# Patient Record
Sex: FEMALE | Race: WHITE | NOT HISPANIC OR LATINO | Employment: STUDENT | URBAN - METROPOLITAN AREA
[De-identification: names, ages, dates, MRNs, and addresses within clinical notes are randomized per-mention and may not be internally consistent; named-entity substitution may affect disease eponyms.]

---

## 2023-10-05 ENCOUNTER — HOSPITAL ENCOUNTER (EMERGENCY)
Facility: HOSPITAL | Age: 18
Discharge: HOME | End: 2023-10-05
Attending: EMERGENCY MEDICINE
Payer: COMMERCIAL

## 2023-10-05 VITALS
RESPIRATION RATE: 16 BRPM | TEMPERATURE: 97.6 F | DIASTOLIC BLOOD PRESSURE: 73 MMHG | OXYGEN SATURATION: 98 % | SYSTOLIC BLOOD PRESSURE: 121 MMHG | HEART RATE: 55 BPM

## 2023-10-05 DIAGNOSIS — R30.0 DYSURIA: ICD-10-CM

## 2023-10-05 DIAGNOSIS — N39.0 UTI (URINARY TRACT INFECTION): Primary | ICD-10-CM

## 2023-10-05 DIAGNOSIS — N39.0 RECURRENT UTI: ICD-10-CM

## 2023-10-05 LAB
BACTERIA UR QL AUTO: ABNORMAL /HPF
BILIRUB UR QL STRIP: NEGATIVE
CLARITY UR: ABNORMAL
COLOR UR: YELLOW
EXT PREGNANCY TEST URINE: NEGATIVE
EXT. CONTROL: NORMAL
GLUCOSE UR STRIP-MCNC: NEGATIVE MG/DL
HGB UR QL STRIP.AUTO: ABNORMAL
KETONES UR STRIP-MCNC: NEGATIVE MG/DL
LEUKOCYTE ESTERASE UR QL STRIP: ABNORMAL
MUCOUS THREADS UR QL AUTO: ABNORMAL
NITRITE UR QL STRIP: NEGATIVE
NON-SQ EPI CELLS URNS QL MICRO: ABNORMAL /HPF
PH UR STRIP.AUTO: 6.5 [PH]
PROT UR STRIP-MCNC: ABNORMAL MG/DL
RBC #/AREA URNS AUTO: ABNORMAL /HPF
SP GR UR STRIP.AUTO: 1.02 (ref 1–1.03)
UROBILINOGEN UR STRIP-ACNC: <2 MG/DL
WBC #/AREA URNS AUTO: ABNORMAL /HPF

## 2023-10-05 PROCEDURE — 87660 TRICHOMONAS VAGIN DIR PROBE: CPT | Performed by: PHYSICIAN ASSISTANT

## 2023-10-05 PROCEDURE — 87529 HSV DNA AMP PROBE: CPT | Performed by: PHYSICIAN ASSISTANT

## 2023-10-05 PROCEDURE — 87086 URINE CULTURE/COLONY COUNT: CPT | Performed by: PHYSICIAN ASSISTANT

## 2023-10-05 PROCEDURE — 81001 URINALYSIS AUTO W/SCOPE: CPT | Performed by: PHYSICIAN ASSISTANT

## 2023-10-05 PROCEDURE — 87186 SC STD MICRODIL/AGAR DIL: CPT | Performed by: PHYSICIAN ASSISTANT

## 2023-10-05 PROCEDURE — 87077 CULTURE AEROBIC IDENTIFY: CPT | Performed by: PHYSICIAN ASSISTANT

## 2023-10-05 PROCEDURE — 87480 CANDIDA DNA DIR PROBE: CPT | Performed by: PHYSICIAN ASSISTANT

## 2023-10-05 PROCEDURE — 87591 N.GONORRHOEAE DNA AMP PROB: CPT | Performed by: PHYSICIAN ASSISTANT

## 2023-10-05 PROCEDURE — 81025 URINE PREGNANCY TEST: CPT | Performed by: PHYSICIAN ASSISTANT

## 2023-10-05 PROCEDURE — 87491 CHLMYD TRACH DNA AMP PROBE: CPT | Performed by: PHYSICIAN ASSISTANT

## 2023-10-05 PROCEDURE — 99284 EMERGENCY DEPT VISIT MOD MDM: CPT | Performed by: PHYSICIAN ASSISTANT

## 2023-10-05 PROCEDURE — 87510 GARDNER VAG DNA DIR PROBE: CPT | Performed by: PHYSICIAN ASSISTANT

## 2023-10-05 PROCEDURE — 99283 EMERGENCY DEPT VISIT LOW MDM: CPT

## 2023-10-05 PROCEDURE — 96372 THER/PROPH/DIAG INJ SC/IM: CPT

## 2023-10-05 RX ORDER — CEPHALEXIN 500 MG/1
500 CAPSULE ORAL EVERY 6 HOURS SCHEDULED
Qty: 28 CAPSULE | Refills: 0 | Status: SHIPPED | OUTPATIENT
Start: 2023-10-05 | End: 2023-10-12

## 2023-10-05 RX ORDER — PHENAZOPYRIDINE HYDROCHLORIDE 200 MG/1
200 TABLET, FILM COATED ORAL 3 TIMES DAILY
Qty: 6 TABLET | Refills: 0 | Status: SHIPPED | OUTPATIENT
Start: 2023-10-05

## 2023-10-05 RX ORDER — KETOROLAC TROMETHAMINE 30 MG/ML
30 INJECTION, SOLUTION INTRAMUSCULAR; INTRAVENOUS ONCE
Status: COMPLETED | OUTPATIENT
Start: 2023-10-05 | End: 2023-10-05

## 2023-10-05 RX ORDER — CEPHALEXIN 500 MG/1
500 CAPSULE ORAL ONCE
Status: COMPLETED | OUTPATIENT
Start: 2023-10-05 | End: 2023-10-05

## 2023-10-05 RX ADMIN — KETOROLAC TROMETHAMINE 30 MG: 30 INJECTION, SOLUTION INTRAMUSCULAR; INTRAVENOUS at 10:05

## 2023-10-05 RX ADMIN — CEPHALEXIN 500 MG: 500 CAPSULE ORAL at 10:05

## 2023-10-05 NOTE — ED PROVIDER NOTES
History  Chief Complaint   Patient presents with   • Possible UTI     C/o urinary frequency, burning and blood in urine. Had been given augmentin 2 weeks ago for similar symptoms at urgent care     HPI     19year-old femalePMHX UTI and with symptoms of urethral and vaginal burning ongoing since yesterday. Recently treated for E. coli UTI and was on 10 days of Augmentin. She was asymptomatic throughout that time. She stopped antibiotics on Saturday and again recurred with both urinary symptoms and vaginal burning. She states she does have a couple of spots of blood in her urine that the urine is tea stained colored. She does however state that she has constant burning and aching in her vaginal area as well. She states she has some scant but what she feels is normal discharge. She is sexually active and monogamous with no known exposures to STD. She presents to emergency department for evaluation. None       No past medical history on file. No past surgical history on file. No family history on file. I have reviewed and agree with the history as documented. E-Cigarette/Vaping     E-Cigarette/Vaping Substances     Social History     Tobacco Use   • Smoking status: Never   • Smokeless tobacco: Never   Substance Use Topics   • Alcohol use: Yes     Comment: social   • Drug use: Never       Review of Systems   Constitutional: Negative for chills and fever. HENT: Negative for ear pain and sore throat. Eyes: Negative for pain and visual disturbance. Respiratory: Negative for cough and shortness of breath. Cardiovascular: Negative for chest pain and palpitations. Gastrointestinal: Negative for abdominal pain and vomiting. Genitourinary: Positive for frequency, hematuria, urgency, vaginal discharge and vaginal pain. Negative for decreased urine volume, difficulty urinating, dysuria, flank pain, menstrual problem, pelvic pain and vaginal bleeding.    Musculoskeletal: Negative for arthralgias and back pain. Skin: Negative for color change and rash. Neurological: Negative for seizures and syncope. All other systems reviewed and are negative. Physical Exam  Physical Exam  Vitals and nursing note reviewed. Exam conducted with a chaperone present. Constitutional:       General: She is not in acute distress. Appearance: She is well-developed. HENT:      Head: Normocephalic and atraumatic. Eyes:      Conjunctiva/sclera: Conjunctivae normal.   Cardiovascular:      Rate and Rhythm: Normal rate and regular rhythm. Heart sounds: No murmur heard. Pulmonary:      Effort: Pulmonary effort is normal. No respiratory distress. Breath sounds: Normal breath sounds. Abdominal:      General: Abdomen is flat. There is no distension. Palpations: Abdomen is soft. Tenderness: There is no abdominal tenderness. There is no right CVA tenderness, left CVA tenderness, guarding or rebound. Negative signs include Seo's sign, Rovsing's sign, McBurney's sign, psoas sign and obturator sign. Hernia: No hernia is present. There is no hernia in the left inguinal area or right inguinal area. Genitourinary:     General: Normal vulva. Exam position: Lithotomy position. Pubic Area: No rash or pubic lice. Adithya stage (genital): 5. Labia:         Right: Tenderness present. No rash, lesion or injury. Left: Tenderness present. No rash, lesion or injury. Urethra: No prolapse, urethral pain or urethral lesion. Comments: Pt w/o rash or erythema of vaginal labia or introitus though she does c/o pain. No significant vaginal D/C. Internal exam deferred as patient states she does not have any internal pain. She states only pain with urination and constant burning of vulvar region. Musculoskeletal:         General: No swelling. Cervical back: Neck supple. Lymphadenopathy:      Lower Body: No right inguinal adenopathy. No left inguinal adenopathy.    Skin: General: Skin is warm and dry. Capillary Refill: Capillary refill takes less than 2 seconds. Neurological:      Mental Status: She is alert. Psychiatric:         Mood and Affect: Mood normal.         Vital Signs  ED Triage Vitals   Temperature Pulse Respirations Blood Pressure SpO2   10/05/23 0818 10/05/23 0818 10/05/23 0818 10/05/23 0818 10/05/23 0818   97.6 °F (36.4 °C) 55 16 121/73 98 %      Temp src Heart Rate Source Patient Position - Orthostatic VS BP Location FiO2 (%)   -- 10/05/23 0818 -- 10/05/23 0818 --    Monitor  Right arm       Pain Score       10/05/23 1005       5           Vitals:    10/05/23 0818   BP: 121/73   Pulse: 55         Visual Acuity      ED Medications  Medications   ketorolac (TORADOL) injection 30 mg (30 mg Intramuscular Given 10/5/23 1005)   cephalexin (KEFLEX) capsule 500 mg (500 mg Oral Given 10/5/23 1005)       Diagnostic Studies  Results Reviewed     Procedure Component Value Units Date/Time    Urine culture [[id number]]  (Abnormal) Collected: 10/05/23 0831    Lab Status: Preliminary result Specimen: Urine, Clean Catch Updated: 10/06/23 0719     Urine Culture >100,000 cfu/ml Gram Negative Cj Enteric Like    HSV TYPE 1,2 DNA PCR [[id number]] Collected: 10/05/23 0936    Lab Status: In process Specimen: Swab from Other Updated: 10/05/23 0942    VAGINOSIS DNA PROBE (AFFIRM) [[id number]] Collected: 10/05/23 0936    Lab Status: In process Specimen: Genital from Vaginal Updated: 10/05/23 0942    POCT pregnancy, urine [[id number]]  (Normal) Resulted: 10/05/23 0937    Lab Status: Final result Updated: 10/05/23 0937     EXT Preg Test, Ur Negative     Control Valid    Chlamydia/GC amplified DNA by PCR [[id number]] Collected: 10/05/23 0859    Lab Status:  In process Specimen: Urine, Other Updated: 10/05/23 0930    Urine Microscopic [[id number]]  (Abnormal) Collected: 10/05/23 0831    Lab Status: Final result Specimen: Urine, Clean Catch Updated: 10/05/23 0920     RBC, UA Innumerable /hpf WBC, UA Innumerable /hpf      Epithelial Cells None Seen /hpf      Bacteria, UA Moderate /hpf      MUCUS THREADS Moderate    UA w Reflex to Microscopic w Reflex to Culture [[id number]]  (Abnormal) Collected: 10/05/23 0831    Lab Status: Final result Specimen: Urine, Clean Catch Updated: 10/05/23 0847     Color, UA Yellow     Clarity, UA Extra Turbid     Specific Gravity, UA 1.018     pH, UA 6.5     Leukocytes, UA Large     Nitrite, UA Negative     Protein, UA 70 (1+) mg/dl      Glucose, UA Negative mg/dl      Ketones, UA Negative mg/dl      Urobilinogen, UA <2.0 mg/dl      Bilirubin, UA Negative     Occult Blood, UA Large                     No orders to display              Procedures  Procedures         ED Course  ED Course as of 10/06/23 0721   u Oct 05, 2023   0933 External vaginal exam performed / chaperoned with TEREZA Jacques. Medical Decision Making  Discussed w/ patient and her PCP over the phone for w/u w/ UCX w/ culture in consideration of prior UTI treated w/ augmentin for Ecoli (pan sensitive as noted in picture on patients phone and EMR portal). Poor differentiation of pain on history and therefore performed urinary and external vaginal exam w/ PCR probes. Pt thankful for evaluation. Discussed will tx/ w/ ancef here and plan for 7 days keflex in the OP setting. Will call to treat if other PCR probes are positive. Pt is reassured. Feeling better s/p toradol. Referred to uro in setting of recurrent UTI. U/A consistent w/ UTI. Cultures sent for sensitivities. Prior sensitive to cephalosporin. The Emergency Department will contact you with any positive test results or otherwise results requiring treatment. If test results negative you will likely not be contacted if no change in treatment required. Additionally results can be reviewed in real-time at your convenience through [address] [place] [address].         Dysuria: acute illness or injury  Recurrent UTI: acute illness or injury  UTI (urinary tract infection): acute illness or injury  Amount and/or Complexity of Data Reviewed  Labs: ordered. Risk  Prescription drug management. Disposition  Final diagnoses:   UTI (urinary tract infection)   Dysuria   Recurrent UTI     Time reflects when diagnosis was documented in both MDM as applicable and the Disposition within this note     Time User Action Codes Description Comment    10/5/2023 10:15 AM Murali Hock Add [N39.0] UTI (urinary tract infection)     10/5/2023 10:19 AM Murali Hock Add [R30.0] Dysuria     10/5/2023 10:32 AM Murali Hock Add [N39.0] Recurrent UTI       ED Disposition     ED Disposition   Discharge    Condition   Stable    Date/Time   Thu Oct 5, 2023 10:15 AM    Comment   Vasu Winter discharge to home/self care. Follow-up Information     Follow up With Specialties Details Why Contact Info Additional Information    [facility]  Call today Call today to obtain local PCP.  [facility] Call today Call today for follow up for recurrent UTI [address] [place] [id number] [phone] [facility], [address] [place] [address]          Discharge Medication List as of 10/5/2023 10:37 AM      START taking these medications    Details   cephalexin (KEFLEX) 500 mg capsule Take 1 capsule (500 mg total) by mouth every 6 (six) hours for 7 days, Starting Thu 10/5/2023, Until Thu 10/12/2023, Normal      phenazopyridine (PYRIDIUM) 200 mg tablet Take 1 tablet (200 mg total) by mouth 3 (three) times a day, Starting Thu 10/5/2023, Normal                 PDMP Review     None          ED Provider  Electronically Signed by           Diamante Cheng PA-C  10/06/23 4035

## 2023-10-05 NOTE — DISCHARGE INSTRUCTIONS
The Emergency Department will contact you with any positive test results or otherwise results requiring treatment. If test results negative you will likely not be contacted if no change in treatment required. Additionally results can be reviewed in real-time at your convenience through [address] [place] [address].

## 2023-10-06 LAB
C TRACH DNA SPEC QL NAA+PROBE: NEGATIVE
CANDIDA RRNA VAG QL PROBE: NEGATIVE
G VAGINALIS RRNA GENITAL QL PROBE: NEGATIVE
N GONORRHOEA DNA SPEC QL NAA+PROBE: NEGATIVE
T VAGINALIS RRNA GENITAL QL PROBE: NEGATIVE

## 2023-10-07 LAB — BACTERIA UR CULT: ABNORMAL

## 2023-10-08 LAB
HSV1 DNA SPEC QL NAA+PROBE: NEGATIVE
HSV2 DNA SPEC QL NAA+PROBE: NEGATIVE

## 2023-10-18 ENCOUNTER — OFFICE VISIT (OUTPATIENT)
Dept: UROLOGY | Facility: AMBULATORY SURGERY CENTER | Age: 18
End: 2023-10-18

## 2023-10-18 VITALS
HEART RATE: 96 BPM | SYSTOLIC BLOOD PRESSURE: 126 MMHG | BODY MASS INDEX: 19.49 KG/M2 | OXYGEN SATURATION: 100 % | DIASTOLIC BLOOD PRESSURE: 84 MMHG | RESPIRATION RATE: 18 BRPM | HEIGHT: 63 IN | WEIGHT: 110 LBS

## 2023-10-18 DIAGNOSIS — N39.0 UTI (URINARY TRACT INFECTION): Primary | ICD-10-CM

## 2023-10-18 DIAGNOSIS — N39.0 RECURRENT UTI (URINARY TRACT INFECTION): ICD-10-CM

## 2023-10-18 DIAGNOSIS — R30.0 DYSURIA: ICD-10-CM

## 2023-10-18 LAB
BACTERIA UR QL AUTO: ABNORMAL /HPF
BILIRUB UR QL STRIP: NEGATIVE
CLARITY UR: ABNORMAL
COLOR UR: ABNORMAL
GLUCOSE UR STRIP-MCNC: NEGATIVE MG/DL
HGB UR QL STRIP.AUTO: ABNORMAL
KETONES UR STRIP-MCNC: NEGATIVE MG/DL
LEUKOCYTE ESTERASE UR QL STRIP: ABNORMAL
MUCOUS THREADS UR QL AUTO: ABNORMAL
NITRITE UR QL STRIP: NEGATIVE
NON-SQ EPI CELLS URNS QL MICRO: ABNORMAL /HPF
PH UR STRIP.AUTO: 6 [PH]
PROT UR STRIP-MCNC: NEGATIVE MG/DL
RBC #/AREA URNS AUTO: ABNORMAL /HPF
SL AMB  POCT GLUCOSE, UA: NORMAL
SL AMB LEUKOCYTE ESTERASE,UA: NORMAL
SL AMB POCT BILIRUBIN,UA: NORMAL
SL AMB POCT BLOOD,UA: NORMAL
SL AMB POCT KETONES,UA: NORMAL
SL AMB POCT NITRITE,UA: NORMAL
SL AMB POCT PH,UA: 6
SL AMB POCT SPECIFIC GRAVITY,UA: 1
SL AMB POCT URINE PROTEIN: NORMAL
SL AMB POCT UROBILINOGEN: 0.2
SP GR UR STRIP.AUTO: 1.01 (ref 1–1.03)
UROBILINOGEN UR STRIP-ACNC: <2 MG/DL
WBC #/AREA URNS AUTO: ABNORMAL /HPF

## 2023-10-18 PROCEDURE — 81001 URINALYSIS AUTO W/SCOPE: CPT

## 2023-10-18 PROCEDURE — 87086 URINE CULTURE/COLONY COUNT: CPT

## 2023-10-18 RX ORDER — METHENAMINE HIPPURATE 1000 MG/1
1 TABLET ORAL 2 TIMES DAILY WITH MEALS
Qty: 30 TABLET | Refills: 1 | Status: SHIPPED | OUTPATIENT
Start: 2023-10-18

## 2023-10-18 RX ORDER — DESOGESTREL AND ETHINYL ESTRADIOL 0.15-0.03
1 KIT ORAL DAILY
COMMUNITY

## 2023-10-18 RX ORDER — NITROFURANTOIN 25; 75 MG/1; MG/1
100 CAPSULE ORAL 2 TIMES DAILY
Qty: 30 CAPSULE | Refills: 1 | Status: SHIPPED | OUTPATIENT
Start: 2023-10-18

## 2023-10-18 NOTE — PROGRESS NOTES
Office Visit- Urology  Castro Blanco 2005 MRN: [id number]      Assessment/Discussion/Plan    19 y.o. female managed by     Recurrent UTI  -Urine culture on 10/5/2023 was positive for E. Coli. With prior positive culture for E. coli as demonstrated in the photograph from ER documentation on 10/5 patient does meet AUA defined criteria for recurrent UTI  - UA today positive for leukocytes and blood. Send out for micro and culture. Since patient is symptomatic we will treat with empiric course of antibiotics and alter antibiotics if needed  -Discussed measures for prevention of recurrent UTI. Does not appear to be related to sexual activity as patient does not use spermicidal condoms.   -Reviewed adequate water intake  -Patient will begin to utilize cranberry supplementation with 36 mg of PAC  -Patient will utilize over-the-counter d-mannose, vitamin C, probiotics  -We will obtain a ultrasound of the kidneys and bladder to ensure no anatomic reason for her recurrent UTIs  -Patient is highly motivated to prevent any further UTIs so we will utilize Hip-Alex  -Patient already established with gynecology  -Follow-up in 3 months. Patient is aware that she can call the office or send a [facility] message with any questions or concerns in the meantime        Chief Complaint:   Richrd Siemens is a 19 y.o. female presenting to the office for an initial evaluation regarding recurrent UTI        Subjective    47-year-old female with no past urologic history who presents to the office today for initial evaluation due to recurrent UTI. She states that over the past year she has experienced about 4 episodes of UTIs. She experiences frequency, urgency, dysuria, occasional gross hematuria, suprapubic pain, and vaginal pain in association with her UTIs. Upon review of chart there was a culture proven UTI with growth of over 100,000 CFU-milliliter of E. coli from October 5, 2023.   Documentation of urine culture with growth of E. coli that was pansensitive prior to this more recent positive urine culture on the fifth. She was originally treated with Augmentin prior to her presentation to the ER and at the ER was discharged on Keflex. However she is having recurrent symptoms. She states that she feels the suprapubic discomfort today urine dip today possibly suggestive of another infection. Patient is sexually active but without concern for STI. She and her partner did not utilize spermicidal condoms. Patient has utilize the techniques such as increasing water intake, cranberry pills, and urinating after sexual activity to try to prevent UTIs. She denies any GI symptoms. Has seen blood in the urine in relation to UTI. Has had negative gonorrhea and Chlamydia testing as well as negative vaginitis panel. No prior genitourinary imaging        ROS:   Review of Systems   Constitutional: Negative. Negative for chills, fatigue and fever. HENT: Negative. Respiratory:  Negative for shortness of breath. Cardiovascular:  Negative for chest pain. Gastrointestinal: Negative. Negative for abdominal pain. Endocrine: Negative. Musculoskeletal: Negative. Skin: Negative. Neurological: Negative. Negative for dizziness and light-headedness. Hematological: Negative. Psychiatric/Behavioral: Negative. Past Medical History  Past Medical History:   Diagnosis Date    Urinary tract infection        Past Surgical History  History reviewed. No pertinent surgical history. Past Family History  History reviewed. No pertinent family history.     Past Social history  Social History     Socioeconomic History    Marital status: Single     Spouse name: Not on file    Number of children: Not on file    Years of education: Not on file    Highest education level: Not on file   Occupational History    Not on file   Tobacco Use    Smoking status: Never    Smokeless tobacco: Never   Vaping Use    Vaping Use: Never used   Substance and Sexual Activity    Alcohol use: Yes     Comment: social    Drug use: Never    Sexual activity: Yes     Partners: Male     Birth control/protection: Condom Male   Other Topics Concern    Not on file   Social History Narrative    Not on file     Social Determinants of Health     Financial Resource Strain: Not on file   Food Insecurity: Not on file   Transportation Needs: Not on file   Physical Activity: Not on file   Stress: Not on file   Social Connections: Not on file   Intimate Partner Violence: Not on file   Housing Stability: Not on file       Current Medications  Current Outpatient Medications   Medication Sig Dispense Refill    desogestrel-ethinyl estradiol (APRI) 0.15-30 MG-MCG per tablet Take 1 tablet by mouth daily      phenazopyridine (PYRIDIUM) 200 mg tablet Take 1 tablet (200 mg total) by mouth 3 (three) times a day 6 tablet 0     No current facility-administered medications for this visit. Allergies  No Known Allergies    OBJECTIVE    Vitals   Vitals:    10/18/23 1152   BP: 126/84   BP Location: Left arm   Patient Position: Sitting   Cuff Size: Adult   Pulse: 96   Resp: 18   SpO2: 100%   Weight: 49.9 kg (110 lb)   Height: 5' 3" (1.6 m)       PVR:    Physical Exam  Constitutional:       General: She is not in acute distress. Appearance: Normal appearance. She is normal weight. She is not ill-appearing or toxic-appearing. HENT:      Head: Normocephalic and atraumatic. Eyes:      Conjunctiva/sclera: Conjunctivae normal.   Cardiovascular:      Rate and Rhythm: Normal rate. Pulmonary:      Effort: Pulmonary effort is normal. No respiratory distress. Skin:     General: Skin is warm and dry. Neurological:      General: No focal deficit present. Mental Status: She is alert and oriented to person, place, and time. Cranial Nerves: No cranial nerve deficit.    Psychiatric:         Mood and Affect: Mood normal.         Behavior: Behavior normal.      Comments: Tearful affect due to stress/anxiety of recurrent UTI          Labs:   LASTLAB(PROTEIN UA,TP,[id number],PROT,PROTEIN UA,PROTEINUA,PROTUR,LABPROTURI,PROTEIN,URPROTEIN)@   No results found for: "PSA"  No results found for: "CREATININE"   No results found for: "HGBA1C"  No results found for: "GLUCOSE", "CALCIUM", "NA", "K", "CO2", "CL", "BUN", "CREATININE"    I have personally reviewed all pertinent lab results and reviewed with patient    Imaging       Acacia Bah PA-C  Date: 10/18/2023 Time: 12:06 PM  [facility]    This note was written using fluency dictation software. Please excuse any resulting minor grammatical errors.

## 2023-10-19 ENCOUNTER — NURSE TRIAGE (OUTPATIENT)
Dept: OTHER | Facility: OTHER | Age: 18
End: 2023-10-19

## 2023-10-19 ENCOUNTER — TELEPHONE (OUTPATIENT)
Age: 18
End: 2023-10-19

## 2023-10-19 ENCOUNTER — TELEPHONE (OUTPATIENT)
Dept: OTHER | Facility: OTHER | Age: 18
End: 2023-10-19

## 2023-10-19 ENCOUNTER — HOSPITAL ENCOUNTER (EMERGENCY)
Facility: HOSPITAL | Age: 18
Discharge: HOME | End: 2023-10-19
Attending: EMERGENCY MEDICINE
Payer: COMMERCIAL

## 2023-10-19 ENCOUNTER — APPOINTMENT (EMERGENCY)
Dept: RADIOLOGY | Facility: HOSPITAL | Age: 18
End: 2023-10-19
Payer: COMMERCIAL

## 2023-10-19 VITALS
TEMPERATURE: 97.5 F | DIASTOLIC BLOOD PRESSURE: 76 MMHG | OXYGEN SATURATION: 97 % | HEART RATE: 84 BPM | RESPIRATION RATE: 17 BRPM | SYSTOLIC BLOOD PRESSURE: 115 MMHG

## 2023-10-19 DIAGNOSIS — N12 PYELONEPHRITIS: Primary | ICD-10-CM

## 2023-10-19 LAB
ANION GAP SERPL CALCULATED.3IONS-SCNC: 7 MMOL/L
BACTERIA UR CULT: ABNORMAL
BASOPHILS # BLD AUTO: 0.05 THOUSANDS/ÂΜL (ref 0–0.1)
BASOPHILS NFR BLD AUTO: 1 % (ref 0–1)
BUN SERPL-MCNC: 7 MG/DL (ref 5–25)
CALCIUM SERPL-MCNC: 9.1 MG/DL (ref 8.4–10.2)
CHLORIDE SERPL-SCNC: 106 MMOL/L (ref 96–108)
CO2 SERPL-SCNC: 26 MMOL/L (ref 21–32)
CREAT SERPL-MCNC: 0.61 MG/DL (ref 0.6–1.3)
EOSINOPHIL # BLD AUTO: 0.08 THOUSAND/ÂΜL (ref 0–0.61)
EOSINOPHIL NFR BLD AUTO: 1 % (ref 0–6)
ERYTHROCYTE [DISTWIDTH] IN BLOOD BY AUTOMATED COUNT: 13 % (ref 11.6–15.1)
EXT PREGNANCY TEST URINE: NEGATIVE
EXT. CONTROL: NORMAL
GFR SERPL CREATININE-BSD FRML MDRD: 130 ML/MIN/1.73SQ M
GLUCOSE SERPL-MCNC: 95 MG/DL (ref 65–140)
HCT VFR BLD AUTO: 38.7 % (ref 34.8–46.1)
HGB BLD-MCNC: 12.7 G/DL (ref 11.5–15.4)
IMM GRANULOCYTES # BLD AUTO: 0.05 THOUSAND/UL (ref 0–0.2)
IMM GRANULOCYTES NFR BLD AUTO: 1 % (ref 0–2)
LYMPHOCYTES # BLD AUTO: 2.05 THOUSANDS/ÂΜL (ref 0.6–4.47)
LYMPHOCYTES NFR BLD AUTO: 20 % (ref 14–44)
MCH RBC QN AUTO: 27.9 PG (ref 26.8–34.3)
MCHC RBC AUTO-ENTMCNC: 32.8 G/DL (ref 31.4–37.4)
MCV RBC AUTO: 85 FL (ref 82–98)
MONOCYTES # BLD AUTO: 0.75 THOUSAND/ÂΜL (ref 0.17–1.22)
MONOCYTES NFR BLD AUTO: 7 % (ref 4–12)
NEUTROPHILS # BLD AUTO: 7.26 THOUSANDS/ÂΜL (ref 1.85–7.62)
NEUTS SEG NFR BLD AUTO: 70 % (ref 43–75)
NRBC BLD AUTO-RTO: 0 /100 WBCS
PLATELET # BLD AUTO: 250 THOUSANDS/UL (ref 149–390)
PMV BLD AUTO: 10.2 FL (ref 8.9–12.7)
POTASSIUM SERPL-SCNC: 3.8 MMOL/L (ref 3.5–5.3)
RBC # BLD AUTO: 4.56 MILLION/UL (ref 3.81–5.12)
SODIUM SERPL-SCNC: 139 MMOL/L (ref 135–147)
WBC # BLD AUTO: 10.24 THOUSAND/UL (ref 4.31–10.16)

## 2023-10-19 PROCEDURE — 80048 BASIC METABOLIC PNL TOTAL CA: CPT | Performed by: EMERGENCY MEDICINE

## 2023-10-19 PROCEDURE — 36415 COLL VENOUS BLD VENIPUNCTURE: CPT | Performed by: EMERGENCY MEDICINE

## 2023-10-19 PROCEDURE — 85025 COMPLETE CBC W/AUTO DIFF WBC: CPT | Performed by: EMERGENCY MEDICINE

## 2023-10-19 PROCEDURE — 76775 US EXAM ABDO BACK WALL LIM: CPT

## 2023-10-19 PROCEDURE — 81025 URINE PREGNANCY TEST: CPT | Performed by: EMERGENCY MEDICINE

## 2023-10-19 RX ORDER — CEFUROXIME AXETIL 250 MG/1
250 TABLET ORAL EVERY 12 HOURS SCHEDULED
Qty: 14 TABLET | Refills: 0 | Status: SHIPPED | OUTPATIENT
Start: 2023-10-20 | End: 2023-10-27

## 2023-10-19 RX ORDER — ONDANSETRON 2 MG/ML
4 INJECTION INTRAMUSCULAR; INTRAVENOUS ONCE
Status: COMPLETED | OUTPATIENT
Start: 2023-10-19 | End: 2023-10-19

## 2023-10-19 RX ORDER — KETOROLAC TROMETHAMINE 30 MG/ML
15 INJECTION, SOLUTION INTRAMUSCULAR; INTRAVENOUS ONCE
Status: COMPLETED | OUTPATIENT
Start: 2023-10-19 | End: 2023-10-19

## 2023-10-19 RX ADMIN — ONDANSETRON 4 MG: 2 INJECTION INTRAMUSCULAR; INTRAVENOUS at 08:52

## 2023-10-19 RX ADMIN — CEFTRIAXONE SODIUM 1000 MG: 10 INJECTION, POWDER, FOR SOLUTION INTRAVENOUS at 08:56

## 2023-10-19 RX ADMIN — KETOROLAC TROMETHAMINE 15 MG: 30 INJECTION, SOLUTION INTRAMUSCULAR; INTRAVENOUS at 08:52

## 2023-10-19 NOTE — TELEPHONE ENCOUNTER
Reason for Disposition   [1] Side (flank) or lower back pain AND [2] new-onset since starting antibiotics    Answer Assessment - Initial Assessment Questions  1. ANTIBIOTIC: "What antibiotic are you taking?" "How many times per day?"      Macrobid     2. DURATION: "When was the antibiotic started?"      10/18/23 at 1pm    3. MAIN SYMPTOM: "What is the main symptom you are concerned about?"      Nausea, moderate to severe constant lower back pain    4. FEVER: "Do you have a fever?" If Yes, ask: "What is it, how was it measured, and when did it start?"      Denies     5. OTHER SYMPTOMS: "Do you have any other symptoms?" (e.g., flank pain, vaginal discharge, blood in urine)      Cough    Patient was seen in the office yesterday regarding recurrent UTIs. Placed on Macrobid, took first dose at 1 PM.  Started with nausea and moderate to severe lower back pain at 7 PM that has been constant. Contacted on-call provider who recommended ER evaluation to rule out kidney stone and/or pyelonephritis. Contacted patient to make her aware of on-call providers recommendations. Patient verbalized understanding but is going to talk to her parents first.  She is unsure whether or not she will go to the ER at this time.     Protocols used: Urinary Tract Infection on Antibiotic Follow-up Call - [facility]

## 2023-10-19 NOTE — ED PROVIDER NOTES
Emergency Department Note- Sandra Camara 19 y.o. female MRN: [id number]    Unit/Bed#: ED 02 Encounter: [id number]        History of Present Illness     Patient is a 38-year-old female with a history of previous urinary tract infections, says on September 20 she began having some increased urinary frequency, dysuria, feeling prior urinary tract infections. She went to urgent care, was diagnosed with UTI and prescribed 10 days of Augmentin which she completed. She says her symptoms improved, and after completing the Augmentin she was doing better but then a day or 2 later she noticed recurrence of her symptoms. Patient seen October 5 in the emergency department, urinalysis was suggestive of infection, culture was greater than 100,000 E. coli, sensitive to Augmentin, resistant to ampicillin, sensitive to Macrobid, and rest of antibiotics tested for. GC, chlamydia, HSV, Candida, Gardnerella and trichomonas were negative. Patient prescribed Keflex which she completed. She said that after completing the Keflex her symptoms had improved and pretty much resolved and she was recommended to follow-up with urology. She says that 3 days ago she noticed some increased urinary frequency and discomfort like her prior urinary infections, was seen yesterday by urology, urinalysis was suggestive of infection, urine culture was obtained and pending. The patient was prescribed Macrobid, and had a renal ultrasound ordered as an outpatient. Patient says she took the first dose of Macrobid yesterday about 5 PM and several hours afterwards developed some bilateral flank pain, feeling relatively uncomfortable. No bladder or bowel incontinence, no saddle anesthesia, no falls or trauma, no fever or chills. She has not noticed any hematuria. She called the urology office was advised to go to the ED.     REVIEW OF SYSTEMS    Positive for the above    Historical Information   Past Medical History:   Diagnosis Date    Urinary tract infection      History reviewed. No pertinent surgical history. Social History   Social History     Substance and Sexual Activity   Alcohol Use Yes    Comment: social     Social History     Substance and Sexual Activity   Drug Use Never     Social History     Tobacco Use   Smoking Status Never   Smokeless Tobacco Never     Family History: History reviewed. No pertinent family history. MEDICATIONS:  No current facility-administered medications on file prior to encounter. Current Outpatient Medications on File Prior to Encounter   Medication Sig Dispense Refill    desogestrel-ethinyl estradiol (APRI) 0.15-30 MG-MCG per tablet Take 1 tablet by mouth daily      methenamine hippurate (HIPREX) 1 g tablet Take 1 tablet (1 g total) by mouth 2 (two) times a day with meals 30 tablet 1    nitrofurantoin (MACROBID) 100 mg capsule Take 1 capsule (100 mg total) by mouth 2 (two) times a day 30 capsule 1    phenazopyridine (PYRIDIUM) 200 mg tablet Take 1 tablet (200 mg total) by mouth 3 (three) times a day 6 tablet 0     ALLERGIES:  No Known Allergies    Vitals:    10/19/23 0735 10/19/23 1003   BP: 124/70 115/76   TempSrc: Temporal    Pulse: 86 84   Resp: 16 17   Patient Position - Orthostatic VS: Sitting Sitting   Temp: 97.5 °F (36.4 °C)        PHYSICAL EXAM    General:  Patient is well-appearing  Head:  Atraumatic  Eyes:  Conjunctiva pink  ENT:  Mucous membranes are moist  Neck:  Supple  Cardiac:  S1-S2, without murmurs  Lungs:  Clear to auscultation bilaterally  Abdomen:  Patient's abdomen is soft and nontender, no tympany, no rigidity, no guarding, she does have noted to her bilateral mid and lower back including over the CVA area.   Back: She has no midline spinal tenderness, no warmth or redness to the back  Extremities:  Normal range of motion  Neurologic:  Awake, fluent speech, normal comprehension, AAOx3, patient can ambulate without difficulty, strength is 5 out of 5 in the bilateral hips, knees, ankles, reflexes are 2 out of 4 at the knees and ankles. She has normal sensation in the legs, no saddle anesthesia  Skin:  Pink warm and dry  Psychiatric:  Alert, pleasant, cooperative      Labs Reviewed   CBC AND DIFFERENTIAL - Abnormal       Result Value Ref Range Status    WBC 10.24 (*) 4.31 - 10.16 Thousand/uL Final    RBC 4.56  3.81 - 5.12 Million/uL Final    Hemoglobin 12.7  11.5 - 15.4 g/dL Final    Hematocrit 38.7  34.8 - 46.1 % Final    MCV 85  82 - 98 fL Final    MCH 27.9  26.8 - 34.3 pg Final    MCHC 32.8  31.4 - 37.4 g/dL Final    RDW 13.0  11.6 - 15.1 % Final    MPV 10.2  8.9 - 12.7 fL Final    Platelets 639  788 - 390 Thousands/uL Final    nRBC 0  /100 WBCs Final    Neutrophils Relative 70  43 - 75 % Final    Immat GRANS % 1  0 - 2 % Final    Lymphocytes Relative 20  14 - 44 % Final    Monocytes Relative 7  4 - 12 % Final    Eosinophils Relative 1  0 - 6 % Final    Basophils Relative 1  0 - 1 % Final    Neutrophils Absolute 7.26  1.85 - 7.62 Thousands/µL Final    Immature Grans Absolute 0.05  0.00 - 0.20 Thousand/uL Final    Lymphocytes Absolute 2.05  0.60 - 4.47 Thousands/µL Final    Monocytes Absolute 0.75  0.17 - 1.22 Thousand/µL Final    Eosinophils Absolute 0.08  0.00 - 0.61 Thousand/µL Final    Basophils Absolute 0.05  0.00 - 0.10 Thousands/µL Final   POCT PREGNANCY, URINE - Normal    EXT Preg Test, Ur Negative   Final    Control Valid   Final   BASIC METABOLIC PANEL    Sodium 375  135 - 147 mmol/L Final    Potassium 3.8  3.5 - 5.3 mmol/L Final    Chloride 106  96 - 108 mmol/L Final    CO2 26  21 - 32 mmol/L Final    ANION GAP 7  mmol/L Final    BUN 7  5 - 25 mg/dL Final    Creatinine 0.61  0.60 - 1.30 mg/dL Final    Comment: Standardized to IDMS reference method    Glucose 95  65 - 140 mg/dL Final    Comment: If the patient is fasting, the ADA then defines impaired fasting glucose as > 100 mg/dL and diabetes as > or equal to 123 mg/dL.     Calcium 9.1  8.4 - 10.2 mg/dL Final    eGFR 130  ml/min/1.73sq m Final Narrative:     National Kidney Disease Foundation guidelines for Chronic Kidney Disease (CKD):                     Stage 1 with normal or high GFR (GFR > 90 mL/min/1.73 square meters)                    Stage 2 Mild CKD (GFR = 60-89 mL/min/1.73 square meters)                    Stage 3A Moderate CKD (GFR = 45-59 mL/min/1.73 square meters)                    Stage 3B Moderate CKD (GFR = 30-44 mL/min/1.73 square meters)                    Stage 4 Severe CKD (GFR = 15-29 mL/min/1.73 square meters)                    Stage 5 End Stage CKD (GFR <15 mL/min/1.73 square meters)                  Note: GFR calculation is accurate only with a steady state creatinine       Medications   ondansetron (ZOFRAN) injection 4 mg (4 mg Intravenous Given 10/19/23 0852)   ketorolac (TORADOL) injection 15 mg (15 mg Intravenous Given 10/19/23 0852)   ceftriaxone (ROCEPHIN) 1 g/50 mL in dextrose IVPB (0 mg Intravenous Stopped 10/19/23 0945)       US kidney and bladder   Final Result      Normal.            Workstation performed: [id number]                  Assessment/Plan     ED Medical Decision Making: On reassessment the patient was feeling comfortable. No change in the above findings. At this point I believe patient mostly has mild pyelonephritis. She is afebrile, hemodynamically stable, she has no evidence of life-threatening ectopic pregnancy. Symptom description and her examination are not consistent with acute bilateral ureteral stones and given her young age, I do not believe that an abdominal CT would be appropriate. Will change antibiotic to cefuroxime as nitrofurantoin is not indicated for pyelonephritis. Supportive care, importance of follow-up and return precautions were discussed with the patient, who expressed understanding. MEDICAL DECISION MAKING CODING    Patient presents with acute new problem with:  Threat to life or bodily function      Chronic conditions affecting care:  As per HPI    COLLECTION AND INTERPRETATION OF DATA  I reviewed prior external notes, including outpatient urology note from yesterday as noted above     I ordered each unique test  Tests reviewed personally by me:  Labs: See above  Imaging: I independently reviewed the renal ultrasound and found no acute pathology, no hydronephrosis. Tests considered but not ordered: See above    RISK  Drugs (OTC, Rx, Controlled substances): Prescription management  All of the patient's current prescription medications should be continued. Surgery  -I considered surgery may be necessary prior to completion of the work up but afterwards there is no indication for immediate surgery          Time reflects when diagnosis was documented in both MDM as applicable and the Disposition within this note       Time User Action Codes Description Comment    10/19/2023  9:54 AM Mini Pro Add [N12] Pyelonephritis           ED Disposition       ED Disposition   Discharge    Condition   Stable    Date/Time   Thu Oct 19, 2023  9:54 AM    Comment   Germán Buckley discharge to home/self care.                    Follow-up Information       Follow up With Specialties Details Why Contact Info Additional [address] [facility] Schedule an appointment as soon as possible for a visit in 5 days  [address] [place] [id number] [phone] [facility], [address] [place] [address] [facility]            Discharge Medication List as of 10/19/2023  9:56 AM        START taking these medications    Details   cefuroxime (CEFTIN) 250 mg tablet Take 1 tablet (250 mg total) by mouth every 12 (twelve) hours for 7 days Do not start before October 20, 2023., Starting Fri 10/20/2023, Until Fri 10/27/2023, Normal                  Brianna Bird, DO  10/19/23 5578

## 2023-10-19 NOTE — TELEPHONE ENCOUNTER
Could you please advise me or this pt on what to do next. She was just seen at the [facility] office with Anastasia Torres 10/18. ..... Mitali Woody

## 2023-10-19 NOTE — TELEPHONE ENCOUNTER
Pt called back to speak to the on call provider in regards to medication she may or may not take. On call provider contacted via TC.

## 2023-10-19 NOTE — TELEPHONE ENCOUNTER
Patient called to let us know that she ended up in the ER this morning with Kidney pain. They did an u/s and told her she has a slight kidney infection. She would like a call back.     CB: [phone]

## 2023-10-19 NOTE — Clinical Note
Aj Augustine was seen and treated in our emergency department on 10/19/2023. Diagnosis:     Berto Oliveira  is off the rest of the shift today. She may return on this date:     Please excuse from class today     If you have any questions or concerns, please don't hesitate to call.       Traci Joya DO    ______________________________           _______________          _______________  [facility] Representative                              Date                                Time

## 2023-10-19 NOTE — TELEPHONE ENCOUNTER
normal renal ultrasound this morning  no stone no abscess no hydro  recurrent e.coli UTI  with flank pain  empiric tx for pyelo    stop the macrobid and take the ceftin prescribed in ER  ibuprofen 600mg tid prn for pain and inflamation or fevers

## 2023-10-19 NOTE — DISCHARGE INSTRUCTIONS
Stop taking the nitrofurnantoin  Start taking cefuroxime tomorrow as prescribed    Kidney Infection   WHAT YOU NEED TO KNOW:   A kidney infection, or pyelonephritis, is a bacterial infection. The infection usually starts in your bladder or urethra and moves into your kidney. One or both kidneys may be infected. DISCHARGE INSTRUCTIONS:   Return to the emergency department if:   You cannot stop vomiting. You have severe pain in your abdomen, lower back, or sides. Contact your healthcare provider if:   You continue to have a fever after you take antibiotics for 3 days. You have questions or concerns about your condition or care.

## 2023-10-20 ENCOUNTER — NURSE TRIAGE (OUTPATIENT)
Age: 18
End: 2023-10-20

## 2023-10-20 NOTE — TELEPHONE ENCOUNTER
Pt's mother called  back is very upset that her daughter has not gotten a call back and that her daughter had go to the Er. She stated that she was "promised" that someone would give her call back yesterday. And she is still waiting. She feels this very unprofessional and wants a call back before the end of the day.      Sumit Moose can be reached at [phone]

## 2023-10-20 NOTE — TELEPHONE ENCOUNTER
I received a call from patient's PCP, Dr. Amber Connell requesting to speak with Henrry GALINDO. I tried calling the office but they were busy, they have a full schedule today. She left me her cell phone number and asked that Henrry Quinones calls either her or Richrd Siemens asap.      CB: [phone] patient   [phone] PCP

## 2023-10-20 NOTE — TELEPHONE ENCOUNTER
I called and spoke with patient and reviewed with the ER performed as well as her imaging. Ultrasound imaging is normal.  Urine culture did demonstrate minimal growth of gram-negative liza. I agreed that she should have discontinued the Macrobid and use Ceftin due to potential for mild pyelonephritis. Patient is currently afebrile. She will finish the course of Ceftin. Start using cranberry supplementation with PAC, d-mannose, probiotics, and Hip-Alex. No further imaging is needed at this point so she can cancel her appointment on Monday for an ultrasound since she already had an ultrasound performed. We will continue to hold off on a cystoscopy at this point in time.   I will see her back in 3 to 4 weeks for symptom reevaluation

## 2023-10-20 NOTE — TELEPHONE ENCOUNTER
Pt called stated that she was told by the [facility] that she needs a follow up appt in 3 weeks in [facility]. Pt also requested to cxl US appt.  Transferred to     Please review

## 2023-10-20 NOTE — TELEPHONE ENCOUNTER
Patient calling back stating she ended up in the ER yesterday regarding UTI. Patient is very upset and requesting to speak with clinical staff right away. Call was transferred to urology triage nurse.

## 2023-10-20 NOTE — TELEPHONE ENCOUNTER
Spoke with patient at length. Patient went to ER yesterday morning. US showed normal renal US, no stone abscess or hydro. Hx of recurrent ecoli UTI with flank pain. Patient has been advised to stop the macrobid and take the ceftin, also to utilize ibuprofen 600mg TID prn for pain and inflammation or fevers. Patient initially ask why she cannot take the pyridum with the ceftin for pain. Reiterated instructions provided by KIRILL. Patient would like to know if she will need US schedule Monday, and also what her next steps are as she has had UTI for months. Patient is requesting to speak with a provider only today. Reassured patient providers have at least 24-48 hours to respond. Patient reiterated she would like a call today.

## 2023-10-20 NOTE — TELEPHONE ENCOUNTER
Called to speak with pt.  She reports just speaking with Trina Mcleod and has no  needs at this time

## 2023-10-24 NOTE — TELEPHONE ENCOUNTER
Pt called stating her appointment needs to moved up sooner than scheduled ,please review as I was unable to assist with request in 2 weeks.

## 2023-10-26 NOTE — TELEPHONE ENCOUNTER
Pt is on her last pill of antibiotic and pt is still complaining of burning with urination . Pt requesting to have Jie Orellana call her.  Please advise     Pt can be reached at [phone]

## 2023-10-27 ENCOUNTER — TELEPHONE (OUTPATIENT)
Dept: UROLOGY | Facility: MEDICAL CENTER | Age: 18
End: 2023-10-27

## 2023-10-27 DIAGNOSIS — R30.0 DYSURIA: Primary | ICD-10-CM

## 2023-10-27 NOTE — TELEPHONE ENCOUNTER
Spoke to pt and advised:    MATEO Lamar-C1 hour ago (11:00 AM)     Please call patient to inform her that at this point on make sure that patient has had an updated pelvic exam by her gynecologist to ensure that there is not a gynecologic reason for her symptomatology. We can obtain repeat urine testing. Please also schedule patient for cystoscopy as well to evaluate the inner anatomy of the urethra and bladder        Pt will go for urine testing 2 days after completion of antibiotic. She is scheduled for cysto on 11/14/23 in [place] office. Pt states she is not established with a GYN yet as she is new to the area.

## 2023-10-27 NOTE — TELEPHONE ENCOUNTER
Please call patient to inform her that at this point on make sure that patient has had an updated pelvic exam by her gynecologist to ensure that there is not a gynecologic reason for her symptomatology. We can obtain repeat urine testing.   Please also schedule patient for cystoscopy as well to evaluate the inner anatomy of the urethra and bladder

## 2023-10-31 ENCOUNTER — APPOINTMENT (OUTPATIENT)
Dept: LAB | Facility: CLINIC | Age: 18
End: 2023-10-31
Payer: COMMERCIAL

## 2023-10-31 DIAGNOSIS — R30.0 DYSURIA: ICD-10-CM

## 2023-10-31 LAB
BACTERIA UR QL AUTO: ABNORMAL /HPF
BILIRUB UR QL STRIP: NEGATIVE
CLARITY UR: CLEAR
COLOR UR: ABNORMAL
GLUCOSE UR STRIP-MCNC: NEGATIVE MG/DL
HGB UR QL STRIP.AUTO: NEGATIVE
KETONES UR STRIP-MCNC: NEGATIVE MG/DL
LEUKOCYTE ESTERASE UR QL STRIP: ABNORMAL
MUCOUS THREADS UR QL AUTO: ABNORMAL
NITRITE UR QL STRIP: NEGATIVE
NON-SQ EPI CELLS URNS QL MICRO: ABNORMAL /HPF
PH UR STRIP.AUTO: 6 [PH]
PROT UR STRIP-MCNC: NEGATIVE MG/DL
RBC #/AREA URNS AUTO: ABNORMAL /HPF
SP GR UR STRIP.AUTO: 1.01 (ref 1–1.03)
UROBILINOGEN UR STRIP-ACNC: <2 MG/DL
WBC #/AREA URNS AUTO: ABNORMAL /HPF

## 2023-10-31 PROCEDURE — 81001 URINALYSIS AUTO W/SCOPE: CPT

## 2023-10-31 PROCEDURE — 87086 URINE CULTURE/COLONY COUNT: CPT

## 2023-11-01 LAB — BACTERIA UR CULT: NORMAL

## 2023-11-02 NOTE — TELEPHONE ENCOUNTER
Urinalysis without any concerning abnormalities. Recommend increased hydration. No concerns for infection.

## 2023-11-02 NOTE — TELEPHONE ENCOUNTER
Patient called stating she would like to know the results of urine test she saw some abnormal results and would like a call back.      Patient can be reached at [phone]

## 2023-11-02 NOTE — TELEPHONE ENCOUNTER
Spoke to patient to advise of AP's note. Patient was insistent on what's the cause of her UA still being positive for Leukocytes, as well as the other red marks on her urinalysis. Advised of being unsure of it still appearing but advised again of AP's note. Patient became argumentative as to why I was calling her and not the provider who ordered the testing to answer her questions. Advised the AP reviewed her testing which was of no concern for abnormalities. She continued to argue as to the reasoning of it still being positive if it is nothing of concern. She requested to speak to the provider who ordered the testing. Advised message will be sent to AP.

## 2023-11-02 NOTE — TELEPHONE ENCOUNTER
Pt calling back requesting a call back from the provider or someone that can interpret the UA . Pt has questions and does not want to speak with someone that cannot answer those question.     Pt can be reached [phone]

## 2024-03-21 NOTE — TELEPHONE ENCOUNTER
1520 Patient discharged home. Gait steady & strong. Patient has no complaints.One band-aid applied. Patient states pain level is a 0/10 at this time.      Call transferred. Pt reports that she was in the ER and still not feeling good. Pt reports that the pharmacy told her that she can not take the two medication together. Pt is very upset and demanding to speak with doctor. Stated she has been waiting 21 hours to speak with someone, did advise call back can take up to 24 hours. Pt still upset, Call transferred to manager.